# Patient Record
Sex: MALE | Race: OTHER | Employment: FULL TIME | URBAN - METROPOLITAN AREA
[De-identification: names, ages, dates, MRNs, and addresses within clinical notes are randomized per-mention and may not be internally consistent; named-entity substitution may affect disease eponyms.]

---

## 2023-04-22 ENCOUNTER — HOSPITAL ENCOUNTER (EMERGENCY)
Age: 23
Discharge: HOME | End: 2023-04-22
Attending: EMERGENCY MEDICINE

## 2023-04-22 VITALS
RESPIRATION RATE: 16 BRPM | SYSTOLIC BLOOD PRESSURE: 129 MMHG | HEART RATE: 81 BPM | WEIGHT: 164.46 LBS | TEMPERATURE: 98.2 F | OXYGEN SATURATION: 99 % | DIASTOLIC BLOOD PRESSURE: 78 MMHG

## 2023-04-22 DIAGNOSIS — L05.01 PILONIDAL ABSCESS: Primary | ICD-10-CM

## 2023-04-22 PROCEDURE — 75810000462 HC INC/DRN PILONIDAL CYST SIMPLE LVL 1 5051

## 2023-04-22 PROCEDURE — 87185 SC STD ENZYME DETCJ PER NZM: CPT

## 2023-04-22 PROCEDURE — 99283 EMERGENCY DEPT VISIT LOW MDM: CPT

## 2023-04-22 PROCEDURE — 74011000250 HC RX REV CODE- 250: Performed by: EMERGENCY MEDICINE

## 2023-04-22 PROCEDURE — 87205 SMEAR GRAM STAIN: CPT

## 2023-04-22 PROCEDURE — 87077 CULTURE AEROBIC IDENTIFY: CPT

## 2023-04-22 RX ORDER — LIDOCAINE 40 MG/G
CREAM TOPICAL
Status: COMPLETED | OUTPATIENT
Start: 2023-04-22 | End: 2023-04-22

## 2023-04-22 RX ORDER — LIDOCAINE HYDROCHLORIDE AND EPINEPHRINE 10; 10 MG/ML; UG/ML
10 INJECTION, SOLUTION INFILTRATION; PERINEURAL
Status: COMPLETED | OUTPATIENT
Start: 2023-04-22 | End: 2023-04-22

## 2023-04-22 RX ORDER — CLINDAMYCIN HYDROCHLORIDE 300 MG/1
300 CAPSULE ORAL 4 TIMES DAILY
Qty: 40 CAPSULE | Refills: 0 | Status: SHIPPED | OUTPATIENT
Start: 2023-04-22 | End: 2023-05-02

## 2023-04-22 RX ADMIN — LIDOCAINE HYDROCHLORIDE,EPINEPHRINE BITARTRATE 100 MG: 10; .01 INJECTION, SOLUTION INFILTRATION; PERINEURAL at 13:25

## 2023-04-22 RX ADMIN — LIDOCAINE 4%: 4 CREAM TOPICAL at 12:58

## 2023-04-22 NOTE — ED NOTES
Pt discharged home with parent/guardian. Pt acting age appropriately, respirations regular and unlabored, cap refill less than two seconds. Skin pink, dry and warm. Lungs clear bilaterally. No further complaints at this time. Parent/guardian verbalized understanding of discharge paperwork and has no further questions at this time. Education provided about continuation of care, follow up care and medication administration. Parent/guardian able to provided teach back about discharge instructions. Patient education given on take home rx, follow up with surgeon and the patient expresses understanding and acceptance of instructions.  Jillian Tejada RN 4/22/2023 1:47 PM

## 2023-04-22 NOTE — ED PROVIDER NOTES
Abscess        Healthy 24y M here with low back pain. States he had a rugby injury 4 months ago and hurt his back. Was evaluated after that time and things seemed ok. In the past week or so he's had increasing pain and noticed an area of swelling around the gluteal cleft. No fever. No drainage. Says he bought some amoxicillin that he started taking as he thought it was infected. History obtained using real-time video . History reviewed. No pertinent past medical history. History reviewed. No pertinent surgical history. History reviewed. No pertinent family history. Social History     Socioeconomic History    Marital status: SINGLE     Spouse name: Not on file    Number of children: Not on file    Years of education: Not on file    Highest education level: Not on file   Occupational History    Not on file   Tobacco Use    Smoking status: Never     Passive exposure: Never    Smokeless tobacco: Never   Substance and Sexual Activity    Alcohol use: Yes     Comment: socially    Drug use: Never    Sexual activity: Not on file   Other Topics Concern    Not on file   Social History Narrative    Not on file     Social Determinants of Health     Financial Resource Strain: Not on file   Food Insecurity: Not on file   Transportation Needs: Not on file   Physical Activity: Not on file   Stress: Not on file   Social Connections: Not on file   Intimate Partner Violence: Not on file   Housing Stability: Not on file         ALLERGIES: Patient has no known allergies. Review of Systems  Review of Systems   Constitutional: (-) weight loss. HEENT: (-) stiff neck   Eyes: (-) discharge. Respiratory: (-) cough. Cardiovascular: (-) syncope. Gastrointestinal: (-) blood in stool. Genitourinary: (-) hematuria. Musculoskeletal: (-) myalgias. Neurological: (-) seizure. Skin: (-) petechiae  Lymph/Immunologic: (-) enlarged lymph nodes  All other systems reviewed and are negative. Vitals:    04/22/23 1241 04/22/23 1244   BP:  129/78   Pulse:  81   Resp:  16   Temp:  98.2 °F (36.8 °C)   SpO2:  99%   Weight: 74.6 kg (164 lb 7.4 oz)             Physical Exam   Nursing note and vitals reviewed. Constitutional: oriented to person, place, and time. appears well-developed and well-nourished. No distress. Head: Normocephalic and atraumatic. Sclera anicteric  Nose: No rhinorrhea  Mouth/Throat: Oropharynx is clear and moist. Pharynx normal  Eyes: Conjunctivae are normal. Pupils are equal, round, and reactive to light. Right eye exhibits no discharge. Left eye exhibits no discharge. Neck: Painless normal range of motion. Neck supple. No LAD. Cardiovascular: Normal rate, regular rhythm, normal heart sounds and intact distal pulses. Exam reveals no gallop and no friction rub. No murmur heard. Pulmonary/Chest:  No respiratory distress. No wheezes. No rales. No rhonchi. No increased work of breathing. No accessory muscle use. Good air exchange throughout. Abdominal: soft, non-tender, no rebound or guarding. No hepatosplenomegaly. Normal bowel sounds throughout. Back: no tenderness to palpation, no deformities, no CVA tenderness. Abscess present at the gluteal cleft just to the left of midline. No active drainage. It is tender to touch. Extremities/Musculoskeletal: Normal range of motion. no tenderness. No edema. Distal extremities are neurovasc intact. Lymphadenopathy:   No adenopathy. Neurological:  Alert and oriented to person, place, and time. Coordination normal. CN 2-12 intact. Motor and sensory function intact. Skin: Skin is warm and dry. No rash noted. No pallor. Medical Decision Making  Amount and/or Complexity of Data Reviewed  Labs: ordered. Risk  OTC drugs. Prescription drug management. 24y M here with a pilonidal abscess. Will numb then drain. Plan to send culture and put him on abx.        I&D Abcess Complex    Date/Time: 4/22/2023 1:34 PM  Performed by: Prince Lucia Ivonne Braden MD  Authorized by: Ken Mcneal MD     Consent:     Consent obtained:  Verbal    Consent given by:  Patient    Risks, benefits, and alternatives were discussed: yes      Risks discussed:  Bleeding and pain    Alternatives discussed:  No treatment  Universal protocol:     Procedure explained and questions answered to patient or proxy's satisfaction: yes      Relevant documents present and verified: yes      Immediately prior to procedure, a time out was called: yes      Patient identity confirmed:  Arm band  Location:     Type:  Pilonidal cyst    Size:  4cm    Location:  Anogenital    Anogenital location:  Gluteal cleft  Pre-procedure details:     Skin preparation:  Antiseptic wash  Sedation:     Sedation type:  None  Anesthesia:     Anesthesia method:  Local infiltration and topical application    Topical anesthetic:  EMLA cream    Local anesthetic:  Lidocaine 1% WITH epi  Procedure type:     Complexity:  Complex  Procedure details:     Ultrasound guidance: no      Needle aspiration: no      Incision types:  Single straight    Incision depth:  Dermal    Wound management:  Probed and deloculated and extensive cleaning    Drainage:  Serous    Drainage amount:   Moderate    Wound treatment:  Wound left open    Packing materials:  1/2 in gauze  Post-procedure details:     Procedure completion:  Tolerated well, no immediate complications

## 2023-04-22 NOTE — ED NOTES
Area dressed with 4x4 and 2 large bandaids. Pt instructed to change bandage at home. 4x4 and bandaids provided to pt.

## 2023-04-22 NOTE — ED TRIAGE NOTES
Triage Note:  #[id number] used for triage. Pt reports about 4 months ago he was playing rugby and he got injured in coccyx area. Pt now reports this week his back was hurting and area was swollen. Pt reports he is now taking amoxicillin that he bought.

## 2023-04-22 NOTE — DISCHARGE INSTRUCTIONS
Tiene un absceso pilonidal que abrimos y drenamos. Dejamos material de empaque adentro para que la herida pueda seguir drenando. Deberá ser visto por un médico cirujano para asegurarse de que esté [employer]. Llame a mojica oficina el lunes por la mañana para programar gabby jessika de seguimiento. También puede regresar al [facility] para que verifiquen y se aseguren de que la herida esté sanando isabelle. You have a pilonidal abscess that we opened and drained. We left packing material inside so that the wound can continue to drain. You will need to be seen by a surgery doctor to make sure it is healing well. Please call their office Monday morning to arrange a follow-up appointment. You may also return to the [facility] to have them check to make sure the wound is healing ok.

## 2023-04-23 LAB
BACTERIA SPEC CULT: ABNORMAL
BACTERIA SPEC CULT: ABNORMAL
GRAM STN SPEC: ABNORMAL
GRAM STN SPEC: ABNORMAL
SERVICE CMNT-IMP: ABNORMAL

## 2023-04-24 LAB
BACTERIA SPEC CULT: ABNORMAL
GRAM STN SPEC: ABNORMAL
GRAM STN SPEC: ABNORMAL
SERVICE CMNT-IMP: ABNORMAL

## 2025-04-03 ENCOUNTER — OFFICE VISIT (OUTPATIENT)
Age: 25
End: 2025-04-03

## 2025-04-03 VITALS
DIASTOLIC BLOOD PRESSURE: 80 MMHG | TEMPERATURE: 98.3 F | SYSTOLIC BLOOD PRESSURE: 122 MMHG | HEIGHT: 65 IN | BODY MASS INDEX: 28.16 KG/M2 | RESPIRATION RATE: 15 BRPM | WEIGHT: 169 LBS | HEART RATE: 71 BPM | OXYGEN SATURATION: 99 %

## 2025-04-03 DIAGNOSIS — L05.91 PILONIDAL CYST: Primary | ICD-10-CM

## 2025-04-03 PROCEDURE — 99203 OFFICE O/P NEW LOW 30 MIN: CPT | Performed by: SURGERY

## 2025-04-03 ASSESSMENT — PATIENT HEALTH QUESTIONNAIRE - PHQ9
2. FEELING DOWN, DEPRESSED OR HOPELESS: NOT AT ALL
SUM OF ALL RESPONSES TO PHQ QUESTIONS 1-9: 0
1. LITTLE INTEREST OR PLEASURE IN DOING THINGS: NOT AT ALL
SUM OF ALL RESPONSES TO PHQ QUESTIONS 1-9: 0

## 2025-04-03 NOTE — PROGRESS NOTES
Damon Kruse is a 24 y.o. male who is referred by PERCY Bowles for further evaluation of a pilonidal cyst.    Information obtained from patient via a  and review of outside records.     Mr. Kruse tells me that he has had problems with a pilonidal cyst for several years now. On review of the outside records, Mr. Kee develops pilonidal abscesses which drain only to recur. No discomfort or drainage in the intergluteal cleft at this time. No h/o fevers or chills. No other complaints today.   He has otherwise been in his usual state of health.     Past Medical History:   Diagnosis Date    Pilonidal cyst 04/03/2025     History reviewed. No pertinent surgical history.    History reviewed. No pertinent family history.    Social History     Socioeconomic History    Marital status: Single     Spouse name: None    Number of children: None    Years of education: None    Highest education level: None   Tobacco Use    Smoking status: Never    Smokeless tobacco: Never   Substance and Sexual Activity    Alcohol use: Yes    Drug use: Never     Review of systems negative except as noted.    Review of Systems   Skin:         No discomfort or drainage in the intergluteal cleft.     Physical Exam  Vitals reviewed.   Constitutional:       General: He is not in acute distress.     Appearance: Normal appearance. He is normal weight.   HENT:      Head: Normocephalic and atraumatic.   Eyes:      General: No scleral icterus.  Cardiovascular:      Rate and Rhythm: Normal rate and regular rhythm.   Pulmonary:      Effort: Pulmonary effort is normal.      Breath sounds: Normal breath sounds.   Abdominal:      General: There is no distension.      Palpations: Abdomen is soft.      Tenderness: There is no abdominal tenderness.   Musculoskeletal:         General: Normal range of motion.      Cervical back: Neck supple.   Lymphadenopathy:      Cervical: No cervical adenopathy.   Skin:     Comments: Well healed scar in

## 2025-04-03 NOTE — PROGRESS NOTES
Identified patient with two patient identifiers (name and ). Reviewed chart in preparation for visit and have obtained necessary documentation.    Damon Kruse is a 24 y.o. male  Chief Complaint   Patient presents with    New Patient    Cyst     pilonidal     /80 (BP Site: Left Upper Arm, Patient Position: Sitting, BP Cuff Size: Large Adult)   Pulse 71   Temp 98.3 °F (36.8 °C) (Oral)   Resp 15   Ht 1.645 m (5' 4.76\")   Wt 76.7 kg (169 lb)   SpO2 99%   BMI 28.33 kg/m²     1. Have you been to the ER, urgent care clinic since your last visit?  Hospitalized since your last visit?no    2. Have you seen or consulted any other health care providers outside of the [facility] since your last visit?  Include any pap smears or colon screening. yes - Access now

## 2025-04-04 ENCOUNTER — PREP FOR PROCEDURE (OUTPATIENT)
Age: 25
End: 2025-04-04

## 2025-04-04 RX ORDER — ACETAMINOPHEN 325 MG/1
1000 TABLET ORAL ONCE
Status: CANCELLED | OUTPATIENT
Start: 2025-04-04 | End: 2025-04-04

## 2025-04-04 RX ORDER — BUPIVACAINE HYDROCHLORIDE 2.5 MG/ML
30 INJECTION, SOLUTION EPIDURAL; INFILTRATION; INTRACAUDAL; PERINEURAL ONCE
Status: CANCELLED | OUTPATIENT
Start: 2025-04-04 | End: 2025-04-04

## 2025-04-17 ENCOUNTER — HOSPITAL ENCOUNTER (OUTPATIENT)
Facility: HOSPITAL | Age: 25
Setting detail: OUTPATIENT SURGERY
Discharge: HOME | End: 2025-04-17
Attending: SURGERY | Admitting: SURGERY

## 2025-04-17 ENCOUNTER — ANESTHESIA EVENT (OUTPATIENT)
Facility: HOSPITAL | Age: 25
End: 2025-04-17

## 2025-04-17 ENCOUNTER — ANESTHESIA (OUTPATIENT)
Facility: HOSPITAL | Age: 25
End: 2025-04-17

## 2025-04-17 VITALS
DIASTOLIC BLOOD PRESSURE: 75 MMHG | WEIGHT: 179 LBS | HEIGHT: 64 IN | BODY MASS INDEX: 30.56 KG/M2 | HEART RATE: 56 BPM | TEMPERATURE: 98.1 F | SYSTOLIC BLOOD PRESSURE: 129 MMHG | OXYGEN SATURATION: 97 % | RESPIRATION RATE: 20 BRPM

## 2025-04-17 DIAGNOSIS — L05.91 PILONIDAL CYST: Primary | ICD-10-CM

## 2025-04-17 PROCEDURE — 6360000002 HC RX W HCPCS: Performed by: ANESTHESIOLOGY

## 2025-04-17 PROCEDURE — 7100000001 HC PACU RECOVERY - ADDTL 15 MIN: Performed by: SURGERY

## 2025-04-17 PROCEDURE — 2720000010 HC SURG SUPPLY STERILE: Performed by: SURGERY

## 2025-04-17 PROCEDURE — 2580000003 HC RX 258: Performed by: ANESTHESIOLOGY

## 2025-04-17 PROCEDURE — 2709999900 HC NON-CHARGEABLE SUPPLY: Performed by: SURGERY

## 2025-04-17 PROCEDURE — 6370000000 HC RX 637 (ALT 250 FOR IP): Performed by: SURGERY

## 2025-04-17 PROCEDURE — 2500000003 HC RX 250 WO HCPCS: Performed by: ANESTHESIOLOGY

## 2025-04-17 PROCEDURE — 6360000002 HC RX W HCPCS: Performed by: SURGERY

## 2025-04-17 PROCEDURE — 3600000002 HC SURGERY LEVEL 2 BASE: Performed by: SURGERY

## 2025-04-17 PROCEDURE — 3700000001 HC ADD 15 MINUTES (ANESTHESIA): Performed by: SURGERY

## 2025-04-17 PROCEDURE — 7100000010 HC PHASE II RECOVERY - FIRST 15 MIN: Performed by: SURGERY

## 2025-04-17 PROCEDURE — 3700000000 HC ANESTHESIA ATTENDED CARE: Performed by: SURGERY

## 2025-04-17 PROCEDURE — 11772 EXC PILONIDAL CYST COMP: CPT | Performed by: SURGERY

## 2025-04-17 PROCEDURE — 3600000012 HC SURGERY LEVEL 2 ADDTL 15MIN: Performed by: SURGERY

## 2025-04-17 PROCEDURE — 7100000000 HC PACU RECOVERY - FIRST 15 MIN: Performed by: SURGERY

## 2025-04-17 PROCEDURE — 88304 TISSUE EXAM BY PATHOLOGIST: CPT

## 2025-04-17 RX ORDER — ROCURONIUM BROMIDE 50 MG/5 ML
SYRINGE (ML) INTRAVENOUS
Status: DISCONTINUED | OUTPATIENT
Start: 2025-04-17 | End: 2025-04-17 | Stop reason: SDUPTHER

## 2025-04-17 RX ORDER — SODIUM CHLORIDE 0.9 % (FLUSH) 0.9 %
5-40 SYRINGE (ML) INJECTION EVERY 12 HOURS SCHEDULED
Status: DISCONTINUED | OUTPATIENT
Start: 2025-04-17 | End: 2025-04-17 | Stop reason: HOSPADM

## 2025-04-17 RX ORDER — MIDAZOLAM HYDROCHLORIDE 1 MG/ML
INJECTION, SOLUTION INTRAMUSCULAR; INTRAVENOUS
Status: DISCONTINUED | OUTPATIENT
Start: 2025-04-17 | End: 2025-04-17 | Stop reason: SDUPTHER

## 2025-04-17 RX ORDER — ACETAMINOPHEN 500 MG
1000 TABLET ORAL EVERY 6 HOURS PRN
Qty: 30 TABLET | Refills: 2 | Status: SHIPPED | OUTPATIENT
Start: 2025-04-17

## 2025-04-17 RX ORDER — OXYCODONE HYDROCHLORIDE 5 MG/1
5 TABLET ORAL EVERY 4 HOURS PRN
Qty: 8 TABLET | Refills: 0 | Status: SHIPPED | OUTPATIENT
Start: 2025-04-17 | End: 2025-04-20

## 2025-04-17 RX ORDER — FENTANYL CITRATE 50 UG/ML
INJECTION, SOLUTION INTRAMUSCULAR; INTRAVENOUS
Status: DISCONTINUED | OUTPATIENT
Start: 2025-04-17 | End: 2025-04-17 | Stop reason: SDUPTHER

## 2025-04-17 RX ORDER — KETOROLAC TROMETHAMINE 30 MG/ML
INJECTION, SOLUTION INTRAMUSCULAR; INTRAVENOUS
Status: DISCONTINUED | OUTPATIENT
Start: 2025-04-17 | End: 2025-04-17 | Stop reason: SDUPTHER

## 2025-04-17 RX ORDER — NEOSTIGMINE METHYLSULFATE 1 MG/ML
INJECTION INTRAVENOUS
Status: DISCONTINUED | OUTPATIENT
Start: 2025-04-17 | End: 2025-04-17 | Stop reason: SDUPTHER

## 2025-04-17 RX ORDER — GLYCOPYRROLATE 0.2 MG/ML
INJECTION INTRAMUSCULAR; INTRAVENOUS
Status: DISCONTINUED | OUTPATIENT
Start: 2025-04-17 | End: 2025-04-17 | Stop reason: SDUPTHER

## 2025-04-17 RX ORDER — SODIUM CHLORIDE 9 MG/ML
INJECTION, SOLUTION INTRAVENOUS PRN
Status: DISCONTINUED | OUTPATIENT
Start: 2025-04-17 | End: 2025-04-17 | Stop reason: HOSPADM

## 2025-04-17 RX ORDER — CEFAZOLIN SODIUM/WATER 2 G/20 ML
2000 SYRINGE (ML) INTRAVENOUS ONCE
Status: COMPLETED | OUTPATIENT
Start: 2025-04-17 | End: 2025-04-17

## 2025-04-17 RX ORDER — BUPIVACAINE HYDROCHLORIDE 2.5 MG/ML
30 INJECTION, SOLUTION EPIDURAL; INFILTRATION; INTRACAUDAL; PERINEURAL ONCE
Status: COMPLETED | OUTPATIENT
Start: 2025-04-17 | End: 2025-04-17

## 2025-04-17 RX ORDER — SODIUM CHLORIDE, SODIUM LACTATE, POTASSIUM CHLORIDE, CALCIUM CHLORIDE 600; 310; 30; 20 MG/100ML; MG/100ML; MG/100ML; MG/100ML
INJECTION, SOLUTION INTRAVENOUS
Status: DISCONTINUED | OUTPATIENT
Start: 2025-04-17 | End: 2025-04-17 | Stop reason: SDUPTHER

## 2025-04-17 RX ORDER — PROPOFOL 10 MG/ML
INJECTION, EMULSION INTRAVENOUS
Status: DISCONTINUED | OUTPATIENT
Start: 2025-04-17 | End: 2025-04-17 | Stop reason: SDUPTHER

## 2025-04-17 RX ORDER — SODIUM CHLORIDE 0.9 % (FLUSH) 0.9 %
5-40 SYRINGE (ML) INJECTION PRN
Status: DISCONTINUED | OUTPATIENT
Start: 2025-04-17 | End: 2025-04-17 | Stop reason: HOSPADM

## 2025-04-17 RX ORDER — ACETAMINOPHEN 500 MG
1000 TABLET ORAL ONCE
Status: COMPLETED | OUTPATIENT
Start: 2025-04-17 | End: 2025-04-17

## 2025-04-17 RX ORDER — SODIUM CHLORIDE, SODIUM LACTATE, POTASSIUM CHLORIDE, CALCIUM CHLORIDE 600; 310; 30; 20 MG/100ML; MG/100ML; MG/100ML; MG/100ML
INJECTION, SOLUTION INTRAVENOUS CONTINUOUS
Status: DISCONTINUED | OUTPATIENT
Start: 2025-04-17 | End: 2025-04-17 | Stop reason: HOSPADM

## 2025-04-17 RX ADMIN — MIDAZOLAM 2 MG: 1 INJECTION INTRAMUSCULAR; INTRAVENOUS at 09:44

## 2025-04-17 RX ADMIN — KETOROLAC TROMETHAMINE 30 MG: 30 INJECTION INTRAMUSCULAR; INTRAVENOUS at 10:40

## 2025-04-17 RX ADMIN — SODIUM CHLORIDE, POTASSIUM CHLORIDE, SODIUM LACTATE AND CALCIUM CHLORIDE: 600; 310; 30; 20 INJECTION, SOLUTION INTRAVENOUS at 09:41

## 2025-04-17 RX ADMIN — PROPOFOL 150 MG: 10 INJECTION, EMULSION INTRAVENOUS at 09:44

## 2025-04-17 RX ADMIN — Medication 35 MG: at 09:45

## 2025-04-17 RX ADMIN — FENTANYL CITRATE 100 MCG: 50 INJECTION INTRAMUSCULAR; INTRAVENOUS at 09:45

## 2025-04-17 RX ADMIN — FENTANYL CITRATE 50 MCG: 50 INJECTION INTRAMUSCULAR; INTRAVENOUS at 10:40

## 2025-04-17 RX ADMIN — NEOSTIGMINE METHYLSULFATE 3 MG: 1 INJECTION INTRAVENOUS at 11:20

## 2025-04-17 RX ADMIN — FENTANYL CITRATE 50 MCG: 50 INJECTION INTRAMUSCULAR; INTRAVENOUS at 10:25

## 2025-04-17 RX ADMIN — ACETAMINOPHEN 1000 MG: 500 TABLET ORAL at 07:19

## 2025-04-17 RX ADMIN — GLYCOPYRROLATE 0.4 MG: 0.2 INJECTION INTRAMUSCULAR; INTRAVENOUS at 11:20

## 2025-04-17 RX ADMIN — Medication 2000 MG: at 09:44

## 2025-04-17 ASSESSMENT — PAIN - FUNCTIONAL ASSESSMENT: PAIN_FUNCTIONAL_ASSESSMENT: NONE - DENIES PAIN

## 2025-04-17 NOTE — PERIOP NOTE
Damon Kruse  2000  [id number]    Situation:  Verbal report given from: Alysa Brandt  Procedure: Procedure(s):  PILONIDAL CYSTECTOMY    Background:    Preoperative diagnosis: Pilonidal cyst [L05.91]    Postoperative diagnosis: * No post-op diagnosis entered *    :  Dr. Guteirrez Spangler    Assistant(s): Circulator: Alysa Brandt RN  Scrub Person First: Shawna Denney RN    Specimens:   ID Type Source Tests Collected by Time Destination   1 : PILONIDAL CYST Tissue Buttock SURGICAL PATHOLOGY Gutierrez Spangler MD 4/17/2025 1032        Assessment:  Intra-procedure medications         Anesthesia gave intra-procedure sedation and medications, see anesthesia flow sheet     Intravenous fluids: LR@ KVO     Vital signs stable

## 2025-04-17 NOTE — DISCHARGE INSTRUCTIONS
Patient Discharge Instructions    Damon J Keecezar Kruse / [id number] : 2000    Admitted 2025 Discharged: 2025       It is important that you take the medication exactly as they are prescribed.   Keep your medication in the bottles provided by the pharmacist and keep a list of the medication names, dosages, and times to be taken in your wallet.   Do not take other medications without consulting your doctor.       What to do at Home    Recommended diet: Regular.     Recommended activity: No Heavy Lifting (Less Than 10-15 Pounds).    No Driving While Taking Oxycodone.    Tylenol 1000 mg every 6 hours for two days and then 1000 mg every 6 hours as needed for pain.     Oxycodone as needed for severe pain.     Can remove dressing in 48 hours.     Drain care as directed.    Please call [phone] with questions regarding drain care.     Minimize sitting upright.     If you experience any of the following symptoms Fevers, Chills, Nausea, Vomitting, Redness or Drainage at Surgical Site(s) or Any Other Questions or Concerns Please Call -  [phone].    Follow-up with Dr. Spangler on 2025 at 9:00 AM at [facility]. Medical Office [facility], [address].        Information obtained by :  I understand that if any problems occur once I am at home I am to contact my physician.    I understand and acknowledge receipt of the instructions indicated above.                                                                                                                                           Physician's or R.N.'s Signature                                                                  Date/Time                                                                                                                                              Patient or Representative Signature                                                          Date/Time

## 2025-04-17 NOTE — ANESTHESIA POSTPROCEDURE EVALUATION
Department of Anesthesiology  Postprocedure Note    Patient: Damon Kruse  MRN: [id number]  YOB: 2000  Date of evaluation: 4/17/2025    Procedure Summary       Date: 04/17/25 Room / Location: [facility] MAIN OR  / [facility] MAIN OR    Anesthesia Start: 0941 Anesthesia Stop: 1156    Procedure: PILONIDAL CYSTECTOMY (Buttocks) Diagnosis:       Pilonidal cyst      (Pilonidal cyst [L05.91])    Surgeons: Gutierrez Spangler MD Responsible Provider: Sandeep De Luna MD    Anesthesia Type: general ASA Status: 1            Anesthesia Type: No value filed.    Dante Phase I: Dante Score: 8    Dante Phase II:      Anesthesia Post Evaluation    Patient location during evaluation: PACU  Patient participation: complete - patient participated  Level of consciousness: awake and alert  Pain score: 1  Airway patency: patent  Nausea & Vomiting: no nausea and no vomiting  Cardiovascular status: blood pressure returned to baseline  Respiratory status: acceptable  Hydration status: euvolemic  Pain management: satisfactory to patient    No notable events documented.   No difficulties

## 2025-04-17 NOTE — H&P
Date of Surgery Update:  Damon Kruse was seen and examined.  History and physical has been reviewed. The patient has been examined. There have been no significant clinical changes since the completion of the originally dated History and Physical.    Signed By: Gutierrez Spangler MD     April 17, 2025 7:53 AM         Please note from the office and include the additional information below:    Past Medical History  Past Medical History:   Diagnosis Date    Pilonidal cyst 04/03/2025        Past Surgical History  History reviewed. No pertinent surgical history.     Social History  The patient Damon Kruse  reports that he has never smoked. He has never used smokeless tobacco. He reports current alcohol use. He reports that he does not use drugs.     Family History  History reviewed. No pertinent family history.

## 2025-04-17 NOTE — BRIEF OP NOTE
Brief Postoperative Note      Patient: Damon Kruse  YOB: 2000  MRN: [id number]    Date of Procedure: 4/17/2025    Pre-Op Diagnosis: Pilonidal Cyst.    Post-Op Diagnosis:  Same.       Procedure:   Pilonidal Cystectomy.    Surgeon(s):  Gutierrez Spangler MD    Assistant:  * No surgical staff found *    Anesthesia: General    Estimated Blood Loss (mL): Approximately 10 ml.    Complications: None    Specimens:   ID Type Source Tests Collected by Time Destination   1 : PILONIDAL CYST Tissue Buttock SURGICAL PATHOLOGY Gutierrez Spangler MD 4/17/2025 1032        Implants:  * No implants in log *      Drains:   Closed/Suction Drain Right Buttock Bulb (Active)       Findings:  Infection Present At Time Of Surgery (PATOS) (choose all levels that have infection present):  No infection present  Other Findings: Pilonidal Cyst.    Electronically signed by Gutierrez Spangler MD on 4/17/2025 at 11:48 AM

## 2025-04-17 NOTE — PERIOP NOTE
Patient meets discharge criteria.  Patient and family provided with verbal and written discharge instructions.  Patient discharged by wheelchair with family   to transport home.

## 2025-04-17 NOTE — ANESTHESIA PRE PROCEDURE
Department of Anesthesiology  Preprocedure Note       Name:  Damon Kruse   Age:  24 y.o.  :  2000                                          MRN:  [id number]         Date:  2025      Surgeon: Surgeon(s):  Gutierrez Spangler MD    Procedure: Procedure(s):  PILONIDAL CYSTECTOMY    Medications prior to admission:   Prior to Admission medications    Not on File       Current medications:    Current Facility-Administered Medications   Medication Dose Route Frequency Provider Last Rate Last Admin   • acetaminophen (TYLENOL) tablet 1,000 mg  1,000 mg Oral Once Gutierrez Spangler MD       • BUPivacaine (PF) (MARCAINE) 0.25 % injection 75 mg  30 mL IntraDERmal Once Gutierrez Spangler MD       • ceFAZolin (ANCEF) 2000 mg in 20 mL IV syringe  2,000 mg IntraVENous Once Gutierrez Spangler MD           Allergies:  No Known Allergies    Problem List:    Patient Active Problem List   Diagnosis Code   • Pilonidal cyst L05.91       Past Medical History:        Diagnosis Date   • Pilonidal cyst 2025       Past Surgical History:  History reviewed. No pertinent surgical history.    Social History:    Social History     Tobacco Use   • Smoking status: Never   • Smokeless tobacco: Never   Substance Use Topics   • Alcohol use: Yes                                Counseling given: Not Answered      Vital Signs (Current): There were no vitals filed for this visit.                                           BP Readings from Last 3 Encounters:   25 122/80       NPO Status:                                                                                 BMI:   Wt Readings from Last 3 Encounters:   25 76.7 kg (169 lb)     There is no height or weight on file to calculate BMI.    CBC: No results found for: \"WBC\", \"RBC\", \"HGB\", \"HCT\", \"MCV\", \"RDW\", \"PLT\"    CMP: No results found for: \"NA\", \"K\", \"CL\", \"CO2\", \"BUN\", \"CREATININE\", \"GFRAA\", \"AGRATIO\", \"LABGLOM\", \"GLUCOSE\", \"GLU\", \"CALCIUM\", \"BILITOT\", \"ALKPHOS\", \"AST\",

## 2025-04-17 NOTE — OP NOTE
[facility]               [address]  [address]                            OPERATIVE REPORT      PATIENT NAME: MILDRED RANDLE      : 2000  MED REC NO: [id number]                       ROOM: OR  ACCOUNT NO: [id number]                       ADMIT DATE: 2025  PROVIDER: Gutierrez Spangler MD    DATE OF SERVICE:  2025    PREOPERATIVE DIAGNOSES:  Pilonidal cyst.    POSTOPERATIVE DIAGNOSES:  Pilonidal cyst.    PROCEDURES PERFORMED:  Pilonidal cystectomy.    SURGEON:  Gutierrez Spangler MD    ASSISTANT:  ***    ANESTHESIA:  General endotracheal.    ESTIMATED BLOOD LOSS:  Approximately 10 mL.    SPECIMENS REMOVED:  Pilonidal cyst to Pathology.    INTRAOPERATIVE FINDINGS:  ***     COMPLICATIONS:  None.    IMPLANTS:  ***    INDICATIONS:  The patient is a 24-year-old man with a pilonidal cyst.  The patient is brought to the operating room at this time for pilonidal cystectomy.  The risks of the procedure included, but not limited to infection, bleeding, wound complications, and recurrent disease were discussed in detail with the patient via .  The patient understood and wished to proceed.    DESCRIPTION OF PROCEDURE:  After consent was obtained, the patient was brought to the operating room where he was intubated while in the supine position on the stretcher.  Following the induction of an adequate level of general anesthesia via the endotracheal tube, compression devices were placed on both lower extremities.  The patient was then carefully placed in the prone position on the operating room table.  After ensuring that all pressure points were well padded, the buttocks were taped apart, prepped with Betadine and draped as a sterile field.  On examination, sinus openings in the intergluteal cleft were identified.  Another sinus opening to the left of the intergluteal cleft was identified as well.  The sinus opening was carefully probed and was

## 2025-04-21 ENCOUNTER — OFFICE VISIT (OUTPATIENT)
Age: 25
End: 2025-04-21

## 2025-04-21 VITALS
BODY MASS INDEX: 29.53 KG/M2 | TEMPERATURE: 98.6 F | HEIGHT: 64 IN | HEART RATE: 76 BPM | DIASTOLIC BLOOD PRESSURE: 80 MMHG | WEIGHT: 173 LBS | OXYGEN SATURATION: 99 % | RESPIRATION RATE: 14 BRPM | SYSTOLIC BLOOD PRESSURE: 128 MMHG

## 2025-04-21 DIAGNOSIS — Z98.890 HISTORY OF EXCISION OF PILONIDAL CYST: Primary | ICD-10-CM

## 2025-04-21 PROBLEM — L05.91 PILONIDAL CYST: Status: RESOLVED | Noted: 2025-04-03 | Resolved: 2025-04-21

## 2025-04-21 PROCEDURE — 99024 POSTOP FOLLOW-UP VISIT: CPT | Performed by: SURGERY

## 2025-04-21 RX ORDER — OXYCODONE HYDROCHLORIDE 5 MG/1
5 TABLET ORAL EVERY 4 HOURS PRN
COMMUNITY

## 2025-04-21 ASSESSMENT — ENCOUNTER SYMPTOMS
NAUSEA: 0
VOMITING: 0

## 2025-04-21 ASSESSMENT — PATIENT HEALTH QUESTIONNAIRE - PHQ9
1. LITTLE INTEREST OR PLEASURE IN DOING THINGS: NOT AT ALL
SUM OF ALL RESPONSES TO PHQ QUESTIONS 1-9: 0
2. FEELING DOWN, DEPRESSED OR HOPELESS: NOT AT ALL

## 2025-04-21 NOTE — PROGRESS NOTES
Damon Kruse is a 24 y.o. male who returns for post-operative evaluation.    Information obtained from patient via .     Mr. Chilango Kruse is s/p pilonidal cystectomy on April 17, 2025. Doing well since then. Post-operative pain resolved but Mr. Chilango Kruse reports discomfort at the site of the drain. No fevers or chills. No nausea or vomiting. No problems with drain care - output has been greater than 30 ml for 24 hours.   He has otherwise been in his usual state of health.     Past Medical History:   Diagnosis Date    History of excision of pilonidal cyst 04/21/2025    Pilonidal cyst 04/03/2025     Past Surgical History:   Procedure Laterality Date    PILONIDAL CYST EXCISION N/A 4/17/2025    PILONIDAL CYSTECTOMY performed by Gutierrez Spangler MD at [facility] MAIN OR     History reviewed. No pertinent family history.    Social History     Socioeconomic History    Marital status: Single     Spouse name: None    Number of children: None    Years of education: None    Highest education level: None   Tobacco Use    Smoking status: Never    Smokeless tobacco: Never   Substance and Sexual Activity    Alcohol use: Yes    Drug use: Never     Review of systems negative except as noted.    Review of Systems   Constitutional:  Negative for chills and fever.   Gastrointestinal:  Negative for nausea and vomiting.   Musculoskeletal:         Discomfort at site of drain.     Physical Exam  Vitals reviewed.   Constitutional:       General: He is not in acute distress.     Appearance: He is normal weight.   HENT:      Head: Normocephalic and atraumatic.   Musculoskeletal:         General: Normal range of motion.   Skin:     Comments: The pilonidal cystectomy incision is clean and well healed. Drain output is bloody.   Neurological:      Mental Status: He is alert.     ASSESSMENT and PLAN  Dry dressing applied over incision.   Mr. Chilango Kruse is doing well post-operatively. I reviewed the operative findings with

## 2025-04-21 NOTE — PROGRESS NOTES
Identified patient with two patient identifiers (name and ). Reviewed chart in preparation for visit and have obtained necessary documentation.    Damon Kruse is a 24 y.o. male  Chief Complaint   Patient presents with    Post-Op Check     /80 (BP Site: Left Upper Arm, Patient Position: Standing, BP Cuff Size: Small Adult)   Pulse 76   Temp 98.6 °F (37 °C) (Oral)   Resp 14   Ht 1.626 m (5' 4\")   Wt 78.5 kg (173 lb)   SpO2 99%   BMI 29.70 kg/m²     1. Have you been to the ER, urgent care clinic since your last visit?  Hospitalized since your last visit?no    2. Have you seen or consulted any other health care providers outside of the [facility] since your last visit?  Include any pap smears or colon screening. no

## 2025-04-24 ENCOUNTER — OFFICE VISIT (OUTPATIENT)
Age: 25
End: 2025-04-24

## 2025-04-24 VITALS
OXYGEN SATURATION: 98 % | DIASTOLIC BLOOD PRESSURE: 74 MMHG | HEART RATE: 89 BPM | RESPIRATION RATE: 14 BRPM | SYSTOLIC BLOOD PRESSURE: 126 MMHG | HEIGHT: 64 IN | BODY MASS INDEX: 29.53 KG/M2 | WEIGHT: 173 LBS | TEMPERATURE: 98.7 F

## 2025-04-24 DIAGNOSIS — Z98.890 HISTORY OF EXCISION OF PILONIDAL CYST: Primary | ICD-10-CM

## 2025-04-24 PROCEDURE — 99024 POSTOP FOLLOW-UP VISIT: CPT | Performed by: SURGERY

## 2025-04-24 ASSESSMENT — PATIENT HEALTH QUESTIONNAIRE - PHQ9
2. FEELING DOWN, DEPRESSED OR HOPELESS: NOT AT ALL
SUM OF ALL RESPONSES TO PHQ QUESTIONS 1-9: 0
1. LITTLE INTEREST OR PLEASURE IN DOING THINGS: NOT AT ALL

## 2025-04-24 NOTE — PROGRESS NOTES
Damon Kruse is a 24 y.o. male who returns for a wound check.    Information obtained from patient via      Mr. Chilango Kruse was last seen on April 21, 2025 for post-operative evaluation. Doing well since then. Mr. Chilango Kruse tells me that his post-operative pain is improving. No fevers or chills. Drain output decreasing and is less than 30 ml for 24 hours.   He has otherwise been in his usual state of health.     Past Medical History:   Diagnosis Date    History of excision of pilonidal cyst 04/21/2025    Pilonidal cyst 04/03/2025     Past Surgical History:   Procedure Laterality Date    PILONIDAL CYST EXCISION N/A 4/17/2025    PILONIDAL CYSTECTOMY performed by Gutierrez Spangler MD at [facility] MAIN OR     History reviewed. No pertinent family history.    Social History     Socioeconomic History    Marital status: Single     Spouse name: None    Number of children: None    Years of education: None    Highest education level: None   Tobacco Use    Smoking status: Never    Smokeless tobacco: Never   Substance and Sexual Activity    Alcohol use: Yes    Drug use: Never     Review of systems negative except as noted.    Review of Systems   Constitutional:  Negative for chills and fever.   Musculoskeletal:         Post-operative pain improved.     Physical Exam  Vitals reviewed.   Constitutional:       General: He is not in acute distress.     Appearance: Normal appearance. He is normal weight.   HENT:      Head: Normocephalic and atraumatic.   Musculoskeletal:         General: Normal range of motion.   Skin:     Comments: The pilonidal cystectomy incision is clean and well healed. Drain output is bloody.   Neurological:      General: No focal deficit present.      Mental Status: He is alert.     ASSESSMENT and PLAN  Drain removed without incident. Dry dressing applied.   Mr. Chilango Kruse is doing well post-operatively. Reassured Mr. Chilango Kruse, via a , that he is doing well thus far.

## 2025-04-24 NOTE — PROGRESS NOTES
Identified patient with two patient identifiers (name and ). Reviewed chart in preparation for visit and have obtained necessary documentation.    Damon Kruse is a 24 y.o. male  Chief Complaint   Patient presents with    Post-Op Check    Wound Check     /74 (BP Site: Right Upper Arm, Patient Position: Sitting, BP Cuff Size: Large Adult)   Pulse 89   Temp 98.7 °F (37.1 °C) (Oral)   Resp 14   Ht 1.626 m (5' 4\")   Wt 78.5 kg (173 lb)   SpO2 98%   BMI 29.70 kg/m²     1. Have you been to the ER, urgent care clinic since your last visit?  Hospitalized since your last visit?no    2. Have you seen or consulted any other health care providers outside of the [facility] since your last visit?  Include any pap smears or colon screening. no

## 2025-05-01 ENCOUNTER — OFFICE VISIT (OUTPATIENT)
Age: 25
End: 2025-05-01

## 2025-05-01 ENCOUNTER — TELEPHONE (OUTPATIENT)
Age: 25
End: 2025-05-01

## 2025-05-01 VITALS
OXYGEN SATURATION: 100 % | TEMPERATURE: 98.3 F | WEIGHT: 173 LBS | SYSTOLIC BLOOD PRESSURE: 122 MMHG | DIASTOLIC BLOOD PRESSURE: 74 MMHG | HEIGHT: 64 IN | HEART RATE: 67 BPM | RESPIRATION RATE: 16 BRPM | BODY MASS INDEX: 29.53 KG/M2

## 2025-05-01 DIAGNOSIS — Z98.890 HISTORY OF EXCISION OF PILONIDAL CYST: Primary | ICD-10-CM

## 2025-05-01 PROCEDURE — 99024 POSTOP FOLLOW-UP VISIT: CPT | Performed by: SURGERY

## 2025-05-01 ASSESSMENT — ENCOUNTER SYMPTOMS
NAUSEA: 0
VOMITING: 0

## 2025-05-01 ASSESSMENT — PATIENT HEALTH QUESTIONNAIRE - PHQ9
SUM OF ALL RESPONSES TO PHQ QUESTIONS 1-9: 0
2. FEELING DOWN, DEPRESSED OR HOPELESS: NOT AT ALL
1. LITTLE INTEREST OR PLEASURE IN DOING THINGS: NOT AT ALL

## 2025-05-01 NOTE — PROGRESS NOTES
Identified patient with two patient identifiers (name and ). Reviewed chart in preparation for visit and have obtained necessary documentation.    Damon Kruse is a 24 y.o. male  Chief Complaint   Patient presents with    Post-Op Check    Wound Check     /74 (BP Site: Left Upper Arm, Patient Position: Sitting, BP Cuff Size: Small Adult)   Pulse 67   Temp 98.3 °F (36.8 °C) (Oral)   Resp 16   Ht 1.626 m (5' 4\")   Wt 78.5 kg (173 lb)   SpO2 100%   BMI 29.70 kg/m²     1. Have you been to the ER, urgent care clinic since your last visit?  Hospitalized since your last visit?no    2. Have you seen or consulted any other health care providers outside of the [facility] since your last visit?  Include any pap smears or colon screening. no

## 2025-05-01 NOTE — PROGRESS NOTES
Damon Kruse is a 24 y.o. male who returns for a wound check.    Information obtained from patient via .     Mr. Chilango Kruse was last seen on April 24, 2025 for a wound check. Doing well since then. Abx completed as prescribed. No further pain or drainage in the intergluteal cleft. No fevers or chills. No nausea or vomiting.   He has otherwise been in his usual state of health.     Past Medical History:   Diagnosis Date    History of excision of pilonidal cyst 04/21/2025    Pilonidal cyst 04/03/2025     Past Surgical History:   Procedure Laterality Date    PILONIDAL CYST EXCISION N/A 4/17/2025    PILONIDAL CYSTECTOMY performed by Gutierrez Spangler MD at [facility] MAIN OR     History reviewed. No pertinent family history.    Social History     Socioeconomic History    Marital status: Single     Spouse name: None    Number of children: None    Years of education: None    Highest education level: None   Tobacco Use    Smoking status: Never    Smokeless tobacco: Never   Substance and Sexual Activity    Alcohol use: Yes    Drug use: Never     Review of systems negative except as noted.    Review of Systems   Constitutional:  Negative for chills and fever.   Gastrointestinal:  Negative for nausea and vomiting.   Musculoskeletal:         Denies pain in the intergluteal cleft.     Physical Exam  Vitals reviewed.   Constitutional:       General: He is not in acute distress.     Appearance: Normal appearance. He is normal weight.   HENT:      Head: Normocephalic and atraumatic.   Musculoskeletal:         General: Normal range of motion.   Skin:     Comments: The skin edges at the superior aspect of the pilonidal cystectomy incision have . No purulent or bloody drainage is noted. No surrounding induration or cellulitis.   Neurological:      General: No focal deficit present.      Mental Status: He is alert.     ASSESSMENT and PLAN  Dry dressing applied over open area of wound.   Mr. Chilango Kruse is

## 2025-05-01 NOTE — TELEPHONE ENCOUNTER
Attempted to contact patient in regards to rescheduling appointment for today with Dr. Spangler. Please offer to be seen at [facility] today with Saira turcios. No answer, left a voicemail for a returned call.

## 2025-05-01 NOTE — TELEPHONE ENCOUNTER
Attempted to contact patients emergency contact in regards to rescheduling appointment for today with Dr. Spangler. Please offer to be seen at [facility] today with Saira turcios. No answer, left a voicemail for a returned call.

## 2025-05-15 ENCOUNTER — OFFICE VISIT (OUTPATIENT)
Age: 25
End: 2025-05-15

## 2025-05-15 VITALS
TEMPERATURE: 98.1 F | HEART RATE: 77 BPM | DIASTOLIC BLOOD PRESSURE: 62 MMHG | WEIGHT: 172 LBS | OXYGEN SATURATION: 100 % | RESPIRATION RATE: 14 BRPM | BODY MASS INDEX: 29.37 KG/M2 | HEIGHT: 64 IN | SYSTOLIC BLOOD PRESSURE: 110 MMHG

## 2025-05-15 DIAGNOSIS — Z98.890 HISTORY OF EXCISION OF PILONIDAL CYST: Primary | ICD-10-CM

## 2025-05-15 PROCEDURE — 99024 POSTOP FOLLOW-UP VISIT: CPT | Performed by: SURGERY

## 2025-05-15 ASSESSMENT — ENCOUNTER SYMPTOMS
NAUSEA: 0
VOMITING: 0

## 2025-05-15 ASSESSMENT — PATIENT HEALTH QUESTIONNAIRE - PHQ9
SUM OF ALL RESPONSES TO PHQ QUESTIONS 1-9: 0
1. LITTLE INTEREST OR PLEASURE IN DOING THINGS: NOT AT ALL
2. FEELING DOWN, DEPRESSED OR HOPELESS: NOT AT ALL

## 2025-05-15 NOTE — PROGRESS NOTES
Identified patient with two patient identifiers (name and ). Reviewed chart in preparation for visit and have obtained necessary documentation.    Damon Kruse is a 24 y.o. male  Chief Complaint   Patient presents with    Post-Op Check    Wound Check     /62 (BP Site: Left Upper Arm, Patient Position: Sitting, BP Cuff Size: Large Adult)   Pulse 77   Temp 98.1 °F (36.7 °C) (Oral)   Resp 14   Ht 1.626 m (5' 4\")   Wt 78 kg (172 lb)   SpO2 100%   BMI 29.52 kg/m²     1. Have you been to the ER, urgent care clinic since your last visit?  Hospitalized since your last visit?no    2. Have you seen or consulted any other health care providers outside of the [facility] since your last visit?  Include any pap smears or colon screening. no

## 2025-05-15 NOTE — PROGRESS NOTES
Damon Kruse is a 24 y.o. male who returns for post-operative evaluation.     Information obtained from patient via .    Mr. Chilango Kruse was last seen on May 1, 2025 for a wound check. Doing well since then. Mr. Chilango Kruse report no pain or drainage at the surgical site. No fevers or chills. No other complaints today.   He has otherwise been in his usual state of health.      Past Medical History:   Diagnosis Date    History of excision of pilonidal cyst 04/21/2025    Pilonidal cyst 04/03/2025     Past Surgical History:   Procedure Laterality Date    PILONIDAL CYST EXCISION N/A 4/17/2025    PILONIDAL CYSTECTOMY performed by Gutierrez Spangler MD at [facility] MAIN OR     History reviewed. No pertinent family history.    Social History     Socioeconomic History    Marital status: Single     Spouse name: None    Number of children: None    Years of education: None    Highest education level: None   Tobacco Use    Smoking status: Never    Smokeless tobacco: Never   Substance and Sexual Activity    Alcohol use: Yes    Drug use: Never     Review of systems negative except as noted.    Review of Systems   Constitutional:  Negative for chills and fever.   Gastrointestinal:  Negative for nausea and vomiting.   Musculoskeletal:         Denies pain in the intergluteal cleft.     Physical Exam  Vitals reviewed.   Constitutional:       General: He is not in acute distress.     Appearance: Normal appearance. He is normal weight.   HENT:      Head: Normocephalic and atraumatic.   Musculoskeletal:         General: Normal range of motion.   Skin:     Comments: The pilonidal cystectomy incision is clean and well healed.   Neurological:      General: No focal deficit present.      Mental Status: He is alert.     ASSESSMENT and PLAN  Mr. Chilango Kruse is doing well post-operatively. Reassured Mr. Chilango Kruse, via a , that he is doing well and that the wound has healed nicely. Cleared him to return to

## 2025-08-01 ENCOUNTER — TRANSCRIBE ORDERS (OUTPATIENT)
Facility: HOSPITAL | Age: 25
End: 2025-08-01

## 2025-08-01 DIAGNOSIS — M25.561 RIGHT KNEE PAIN, UNSPECIFIED CHRONICITY: Primary | ICD-10-CM

## (undated) DEVICE — COVER,MAYO STAND,STERILE: Brand: MEDLINE

## (undated) DEVICE — SUTURE VICRYL + SZ 2-0 L27IN ABSRB WHT SH 1/2 CIR TAPERCUT VCP417H

## (undated) DEVICE — SPONGE DRN W4XL4IN RAYON/POLYESTER 6 PLY NONWOVEN PRECUT 2 PER PK

## (undated) DEVICE — HYPODERMIC SAFETY NEEDLE: Brand: MONOJECT

## (undated) DEVICE — LIQUIBAND RAPID ADHESIVE 36/CS 0.8ML: Brand: MEDLINE

## (undated) DEVICE — SPONGE GZ W4XL4IN COT 12 PLY TYP VII WVN C FLD DSGN STERILE

## (undated) DEVICE — COVER LT HNDL PLAS RIG 1 PER PK

## (undated) DEVICE — CELLERATERX® SURGICAL ACTIVATED COLLAGEN® POWDER IS TYPE I BOVINE HYDROLYZED COLLAGEN AND CONTAINS NO ADDITIVES.: Brand: CELLERATERX SURGICAL

## (undated) DEVICE — SUTURE ETHILON SZ 2-0 L18IN NONABSORBABLE BLK L19MM PS-2 PRIM 593H

## (undated) DEVICE — SUTURE MONOCRYL SZ 3-0 L27IN ABSRB UD PS-2 3/8 CIR REV CUT NDL MCP427H

## (undated) DEVICE — BASIN ST MAJOR-NO CAUTERY: Brand: MEDLINE INDUSTRIES, INC.

## (undated) DEVICE — RESERVOIR,SUCTION,100CC,SILICONE: Brand: MEDLINE

## (undated) DEVICE — KIT,1200CC CANISTER,3/16"X6' TUBING: Brand: MEDLINE INDUSTRIES, INC.

## (undated) DEVICE — PENCIL ES CRD L10FT HND SWCHING ROCK SWCH W/ EDGE COAT BLDE

## (undated) DEVICE — SYRINGE MED 10ML LUERLOCK TIP W/O SFTY DISP

## (undated) DEVICE — SKIN PREP TRAY 4 COMPARTM TRAY: Brand: MEDLINE INDUSTRIES, INC.

## (undated) DEVICE — DRAIN SURG 19FR 100% SIL RADPQ RND CHN FULL FLUT

## (undated) DEVICE — DRAPE,LAPAROTOMY,T,PEDI,STERILE: Brand: MEDLINE

## (undated) DEVICE — AGENT HEMOSTATIC SURG NU-KNIT ABS 3X4IN WOVEN KNIT 12/BX

## (undated) DEVICE — SOLUTION IRRIG 1000ML 09% SOD CHL USP PIC PLAS CONTAINER